# Patient Record
Sex: MALE | NOT HISPANIC OR LATINO | URBAN - METROPOLITAN AREA
[De-identification: names, ages, dates, MRNs, and addresses within clinical notes are randomized per-mention and may not be internally consistent; named-entity substitution may affect disease eponyms.]

---

## 2019-04-18 ENCOUNTER — APPOINTMENT (OUTPATIENT)
Age: 64
Setting detail: DERMATOLOGY
End: 2019-04-18

## 2019-04-18 PROCEDURE — OTHER ADDITIONAL NOTES: OTHER

## 2019-04-18 NOTE — PROCEDURE: ADDITIONAL NOTES
Additional Notes: Patient has DPOA that did not come with him to his appointment. \\nSee associated chart note.
Detail Level: Detailed